# Patient Record
Sex: FEMALE
[De-identification: names, ages, dates, MRNs, and addresses within clinical notes are randomized per-mention and may not be internally consistent; named-entity substitution may affect disease eponyms.]

---

## 2018-08-08 ENCOUNTER — HOSPITAL ENCOUNTER (EMERGENCY)
Dept: HOSPITAL 14 - H.ER | Age: 68
Discharge: HOME | End: 2018-08-08
Payer: MEDICARE

## 2018-08-08 VITALS
SYSTOLIC BLOOD PRESSURE: 137 MMHG | RESPIRATION RATE: 18 BRPM | DIASTOLIC BLOOD PRESSURE: 79 MMHG | HEART RATE: 93 BPM | OXYGEN SATURATION: 98 %

## 2018-08-08 DIAGNOSIS — F41.9: Primary | ICD-10-CM

## 2018-08-08 DIAGNOSIS — G20: ICD-10-CM

## 2018-08-08 NOTE — ED PDOC
HPI: Psych/Substance Abuse


Time Seen by Provider: 08/08/18 20:53


Chief Complaint (Nursing): Anxiety


Chief Complaint (Provider): Anxiety


History Per: Patient


History/Exam Limitations: no limitations


Onset/Duration Of Symptoms: Days (x60)


Current Symptoms Are (Timing): Still Present


Additional Complaint(s): 


66 y/o female with a PMHx of Parkinson's disease, anxiety and breast Cancer 

presents to the ED with sister who states she has been anxious. Sister reports 

anxiousness has been worsening for the past two months. Patient was prescribed 

Xanex for Anxiety. 











PMD: Yasmin Frank





Past Medical History


Reviewed: Historical Data, Nursing Documentation, Vital Signs





- Medical History


PMH: Anxiety, Parkinson's Disease


Other PMH: Breast Cancer





- Surgical History


Surgical History: No Surg Hx





- Family History


Family History: States: Unknown Family Hx





- Home Medications


Home Medications: 


 Ambulatory Orders











 Medication  Instructions  Recorded


 


busPIRone [Buspar] 5 mg PO BID #13 tab 08/08/18














- Allergies


Allergies/Adverse Reactions: 


 Allergies











Allergy/AdvReac Type Severity Reaction Status Date / Time


 


No Known Allergies Allergy   Verified 04/16/16 13:12














Review of Systems


ROS Statement: Except As Marked, All Systems Reviewed And Found Negative


Psych: Positive for: Anxiety





Physical Exam





- Reviewed


Nursing Documentation Reviewed: Yes


Vital Signs Reviewed: Yes





- Physical Exam


Appears: Positive for: Non-toxic, No Acute Distress (Continous movements noted)


Head Exam: Positive for: ATRAUMATIC, NORMOCEPHALIC


Skin: Positive for: Normal Color, Warm, Dry


Eye Exam: Positive for: Normal appearance, EOMI, PERRL


Neck: Positive for: Normal, Painless ROM


Cardiovascular/Chest: Positive for: Regular Rate, Rhythm.  Negative for: Murmur


Respiratory: Positive for: Normal Breath Sounds.  Negative for: Respiratory 

Distress


Gastrointestinal/Abdominal: Positive for: Normal Exam, Soft.  Negative for: 

Tenderness


Back: Positive for: Normal Inspection.  Negative for: L CVA Tenderness, R CVA 

Tenderness, Vertebral Tenderness


Extremity: Positive for: Normal ROM.  Negative for: Deformity


Neurologic/Psych: Positive for: Alert (Awake)





Medical Decision Making


Medical Decision Making: 


Time: 2110


Plan:


-- Crisis Evaluation as ordered











________________________________________________________________________________

___________


Scribe Attestation:


Documented by Dee Capps acting as a scribe for Dr. Jeni Dickey MD.





Provider Scribe Attestation:


All medical record entries made by the Scribe were at my direction and 

personally dictated by me. I have reviewed the chart and agree that the record 

accurately reflects my personal performance of the history, physical exam, 

medical decision making, and the department course for this patient. I have 

also personally directed, reviewed, and agree with the discharge instructions 

and disposition.





Disposition





- Clinical Impression


Clinical Impression: 


 Anxiety








- Disposition


Disposition: Routine/Home


Disposition Time: 21:52


Condition: STABLE


Additional Instructions: 


FOLLOW-UP WITH OUTPATIENT PSYCH AS ADVISED. 


Prescriptions: 


busPIRone [Buspar] 5 mg PO BID #13 tab


Instructions:  Anxiety, Adult (DC)


Forms:  [employer] (Lao)


Print Language: Turkish

## 2018-08-22 ENCOUNTER — HOSPITAL ENCOUNTER (EMERGENCY)
Dept: HOSPITAL 14 - H.ER | Age: 68
Discharge: HOME | End: 2018-08-22
Payer: MEDICARE

## 2018-08-22 VITALS
RESPIRATION RATE: 18 BRPM | SYSTOLIC BLOOD PRESSURE: 116 MMHG | DIASTOLIC BLOOD PRESSURE: 70 MMHG | TEMPERATURE: 98.4 F | HEART RATE: 88 BPM

## 2018-08-22 VITALS — OXYGEN SATURATION: 99 %

## 2018-08-22 DIAGNOSIS — F41.9: ICD-10-CM

## 2018-08-22 DIAGNOSIS — G20: ICD-10-CM

## 2018-08-22 DIAGNOSIS — F41.0: Primary | ICD-10-CM

## 2018-08-22 LAB
ANISOCYTOSIS BLD QL SMEAR: (no result)
BASOPHILS # BLD AUTO: 0 K/UL (ref 0–0.2)
BASOPHILS NFR BLD: 1 % (ref 0–2)
BASOPHILS NFR BLD: 1 % (ref 0–2)
BILIRUB UR-MCNC: NEGATIVE MG/DL
BUN SERPL-MCNC: 33 MG/DL (ref 7–17)
CALCIUM SERPL-MCNC: 9 MG/DL (ref 8.4–10.2)
COLOR UR: YELLOW
EOSINOPHIL # BLD AUTO: 0.2 K/UL (ref 0–0.7)
EOSINOPHIL NFR BLD: 4 % (ref 0–7)
EOSINOPHIL NFR BLD: 4.4 % (ref 0–4)
ERYTHROCYTE [DISTWIDTH] IN BLOOD BY AUTOMATED COUNT: 15.7 % (ref 11.5–14.5)
GFR NON-AFRICAN AMERICAN: > 60
GLUCOSE UR STRIP-MCNC: (no result) MG/DL
HGB BLD-MCNC: 8.7 G/DL (ref 12–16)
LEUKOCYTE ESTERASE UR-ACNC: (no result) LEU/UL
LYMPHOCYTE: 11 % (ref 20–50)
LYMPHOCYTES # BLD AUTO: 0.4 K/UL (ref 1–4.3)
LYMPHOCYTES NFR BLD AUTO: 9.6 % (ref 20–40)
MCH RBC QN AUTO: 29.1 PG (ref 27–31)
MCHC RBC AUTO-ENTMCNC: 32.7 G/DL (ref 33–37)
MCV RBC AUTO: 89 FL (ref 81–99)
MONOCYTE: 8 % (ref 0–10)
MONOCYTES # BLD: 0.3 K/UL (ref 0–0.8)
MONOCYTES NFR BLD: 6.5 % (ref 0–10)
NEUTROPHILS # BLD: 3.2 K/UL (ref 1.8–7)
NEUTROPHILS NFR BLD AUTO: 76 % (ref 42–75)
NEUTROPHILS NFR BLD AUTO: 78.5 % (ref 50–75)
NRBC BLD AUTO-RTO: 0 % (ref 0–0)
PH UR STRIP: 6 [PH] (ref 5–8)
PLATELET # BLD EST: NORMAL 10*3/UL
PLATELET # BLD: 305 K/UL (ref 130–400)
PMV BLD AUTO: 7.3 FL (ref 7.2–11.7)
POIKILOCYTOSIS BLD QL SMEAR: SLIGHT
PROT UR STRIP-MCNC: NEGATIVE MG/DL
RBC # BLD AUTO: 3 MIL/UL (ref 3.8–5.2)
RBC # UR STRIP: NEGATIVE /UL
SP GR UR STRIP: 1.02 (ref 1–1.03)
SQUAMOUS EPITHIAL: 2 /HPF (ref 0–5)
TOTAL CELLS COUNTED BLD: 100
TOXIC GRANULES BLD QL SMEAR: PRESENT
URINE CLARITY: (no result)
UROBILINOGEN UR-MCNC: (no result) MG/DL (ref 0.2–1)
WBC # BLD AUTO: 4.1 K/UL (ref 4.8–10.8)

## 2018-08-22 PROCEDURE — 81003 URINALYSIS AUTO W/O SCOPE: CPT

## 2018-08-22 PROCEDURE — 85025 COMPLETE CBC W/AUTO DIFF WBC: CPT

## 2018-08-22 PROCEDURE — 82948 REAGENT STRIP/BLOOD GLUCOSE: CPT

## 2018-08-22 PROCEDURE — 80048 BASIC METABOLIC PNL TOTAL CA: CPT

## 2018-08-22 PROCEDURE — 99285 EMERGENCY DEPT VISIT HI MDM: CPT

## 2018-08-22 NOTE — ED PDOC
HPI: Psych/Substance Abuse


Time Seen by Provider: 08/22/18 17:54


Chief Complaint (Nursing): Psychiatric Evaluation


Chief Complaint (Provider): Psychiatric Evaluation


History Per: Patient


History/Exam Limitations: no limitations


Onset/Duration Of Symptoms: Days


Current Symptoms Are (Timing): Still Present


Associated Symptoms: Anxiety.  denies: Depression, Suicidal Thoughts, Suicidal 

Plan


Additional History Per: Family (sister)


Additional Complaint(s): 


67 year old female presents to the ED for a psychiatric evaluation. Patient 

presents with her sister stating she has anxiety attacks for several months.  

She visited the psychiatrist and had a panic attack so she was referred to the 

ED. Patient feels anxious now and she does not take any medication for her 

symptoms. Patient denies headache, chest pain, homicidal or suicidal ideation. 


PMD: Dr. Thomason








Past Medical History


Reviewed: Historical Data, Nursing Documentation, Vital Signs


Vital Signs: 





 Last Vital Signs











Temp  98.0 F   08/22/18 17:32


 


Pulse  102 H  08/22/18 17:32


 


Resp  17   08/22/18 17:32


 


BP  107/62   08/22/18 17:32


 


Pulse Ox  99   08/22/18 17:32














- Medical History


PMH: Anemia, Anxiety, Parkinson's Disease


   Denies: Diabetes, Hepatitis, HIV, HTN, Seizures, Sexually Transmitted Disease





- Family History


Family History: States: Unknown Family Hx





- Social History


Current smoker - smoking cessation education provided: No


Alcohol: None


Drugs: Denies





- Home Medications


Home Medications: 


 Ambulatory Orders











 Medication  Instructions  Recorded


 


busPIRone [Buspar] 5 mg PO BID #13 tab 08/08/18














- Allergies


Allergies/Adverse Reactions: 


 Allergies











Allergy/AdvReac Type Severity Reaction Status Date / Time


 


No Known Allergies Allergy   Verified 04/16/16 13:12














Review of Systems


ROS Statement: Except As Marked, All Systems Reviewed And Found Negative


Cardiovascular: Negative for: Chest Pain


Neurological: Negative for: Headache


Psych: Positive for: Anxiety.  Negative for: Suicidal ideation (homicidal 

ideation)





Physical Exam





- Reviewed


Nursing Documentation Reviewed: Yes


Vital Signs Reviewed: Yes





- Physical Exam


Appears: Positive for: Non-toxic, No Acute Distress


Head Exam: Positive for: ATRAUMATIC, NORMAL INSPECTION, NORMOCEPHALIC


Skin: Positive for: Normal Color, Warm, Dry


Eye Exam: Positive for: Normal appearance, EOMI, PERRL


ENT: Positive for: Normal ENT Inspection


Neck: Positive for: Normal, Painless ROM, Supple


Cardiovascular/Chest: Positive for: Regular Rate, Rhythm.  Negative for: Murmur


Respiratory: Positive for: Normal Breath Sounds.  Negative for: Decreased 

Breath Sounds, Wheezing, Respiratory Distress


Gastrointestinal/Abdominal: Positive for: Normal Exam, Bowel Sounds, Soft.  

Negative for: Tenderness, Guarding, Rebound


Back: Positive for: Normal Inspection.  Negative for: L CVA Tenderness, R CVA 

Tenderness


Extremity: Positive for: Normal ROM.  Negative for: Tenderness, Pedal Edema, 

Deformity


Neurologic/Psych: Positive for: Alert, Oriented (x3), Mood/Affect (anxious)





- Laboratory Results


Result Diagrams: 


 08/22/18 18:20





 08/22/18 18:20





- ECG


O2 Sat by Pulse Oximetry: 99 (RA)


Pulse Ox Interpretation: Normal





Medical Decision Making


Medical Decision Making: 


Time: 1804


Initial Impression: anxiety and panic attacks


Differential Diagnosis includes but is not limited to: depression  


Initial Plan:


--Alcohol Serum 


--BMP


--Drug Screen


--Crisis Evaluation as Ordered 


--CBC w/ Differential 


--Urinalysis 


--Reevaluation 








Time: 1900


Patient signed out to Dr. Kimbrough pending reevaluation and disposition


--------------------------------------------------------------------------------

-----------------


Scribe Attestation:   


Documented by Yue Chan, acting as a scribe for Yefri Sosa MD





Provider Scribe Attestation:


All medical record entries made by the Scribe were at my direction and 

personally dictated by me. I have reviewed the chart and agree that the record 

accurately reflects my personal performance of the history, physical exam, 

medical decision making, and the department course for this patient. I have 

also personally directed, reviewed, and agree with the discharge instructions 

and disposition.











Disposition





- Clinical Impression


Clinical Impression: 


 Parkinson disease, Anxiety








- Patient ED Disposition


Is Patient to be Admitted: No


Counseled Patient/Family Regarding: Studies Performed, Diagnosis





- Disposition


Disposition: Transfer of Care


Disposition Time: 19:00


Condition: STABLE


Instructions:  Parkinson Disease, Anxiety, Adult (DC)


Patient Signed Over To: Obed Kimbrough (pending reevaluation and 

disposition)

## 2018-09-04 ENCOUNTER — HOSPITAL ENCOUNTER (EMERGENCY)
Dept: HOSPITAL 14 - H.ER | Age: 68
Discharge: HOME | End: 2018-09-04
Payer: MEDICARE

## 2018-09-04 VITALS — DIASTOLIC BLOOD PRESSURE: 63 MMHG | RESPIRATION RATE: 14 BRPM | HEART RATE: 72 BPM | SYSTOLIC BLOOD PRESSURE: 119 MMHG

## 2018-09-04 VITALS — TEMPERATURE: 98.2 F

## 2018-09-04 DIAGNOSIS — Z85.3: ICD-10-CM

## 2018-09-04 DIAGNOSIS — F41.9: Primary | ICD-10-CM

## 2018-09-04 DIAGNOSIS — G20: ICD-10-CM

## 2018-09-04 PROCEDURE — 96372 THER/PROPH/DIAG INJ SC/IM: CPT

## 2018-09-04 PROCEDURE — 99284 EMERGENCY DEPT VISIT MOD MDM: CPT

## 2018-09-04 PROCEDURE — 81025 URINE PREGNANCY TEST: CPT

## 2018-09-04 NOTE — ED PDOC
HPI: Psych/Substance Abuse


Time Seen by Provider: 09/04/18 16:35


Chief Complaint (Nursing): Anxiety


Chief Complaint (Provider): Anxiety


History Per: Patient, Family


History/Exam Limitations: no limitations


Onset/Duration Of Symptoms: Other (x6 months)


Current Symptoms Are (Timing): Still Present


Associated Symptoms: Anxiety


Additional Complaint(s): 





67 year old female presents to the ED complaining of anxiety and nervousness 

for about 6 months.  Patient's family member reports that for the past month, 

symptoms have been worsening, have been occurring frequently, and is unable to 

sleep at night.  Patient has a history of Parkinson's disease and has been 

compliant with medications.  She has not taken any medications for anxiety and 

nervousness and family member does not think these complaints have to do with 

Parkinson's disease.  Today's visit is nothing special as it happens every day.

  She hyperventilates and cries when she is nervous and sweats with anxiety.  

Denies CP, SOB, and fever.  Patient's family member is requesting to be seen by 

psychotherapist and wants an appointment.  





PMD: Yasmin Mckeon





Past Medical History


Reviewed: Historical Data, Nursing Documentation, Vital Signs





- Medical History


PMH: Anemia, Anxiety, Parkinson's Disease


   Denies: Diabetes, Hepatitis, HIV, HTN, Seizures, Sexually Transmitted Disease


Other PMH: Breast cancer (many years ago)





- Surgical History


Surgical History: No Surg Hx


Other surgeries: mastectomy





- Family History


Family History: States: Unknown Family Hx





- Home Medications


Home Medications: 


 Ambulatory Orders











 Medication  Instructions  Recorded


 


busPIRone [Buspar] 5 mg PO BID #13 tab 08/08/18














- Allergies


Allergies/Adverse Reactions: 


 Allergies











Allergy/AdvReac Type Severity Reaction Status Date / Time


 


No Known Allergies Allergy   Verified 09/04/18 15:48














Review of Systems


ROS Statement: Except As Marked, All Systems Reviewed And Found Negative


Constitutional: Negative for: Fever


Cardiovascular: Negative for: Chest Pain


Respiratory: Negative for: Shortness of Breath


Psych: Positive for: Anxiety, Other (Nervousness)





Physical Exam





- Reviewed


Nursing Documentation Reviewed: Yes


Vital Signs Reviewed: Yes





- Physical Exam


Appears: Positive for: Non-toxic, No Acute Distress (anxious and crying; has 

dyskinesia)


Head Exam: Positive for: ATRAUMATIC, NORMOCEPHALIC


Skin: Positive for: Normal Color, Warm, Dry


Eye Exam: Positive for: Normal appearance


Neck: Positive for: Normal, Painless ROM


Cardiovascular/Chest: Positive for: Regular Rate, Rhythm.  Negative for: Murmur


Respiratory: Positive for: Normal Breath Sounds.  Negative for: Wheezing, 

Respiratory Distress


Extremity: Positive for: Normal ROM


Neurologic/Psych: Positive for: Alert, Oriented.  Negative for: Motor/Sensory 

Deficits





- ECG


O2 Sat by Pulse Oximetry: 98 (RA)


Pulse Ox Interpretation: Normal





Medical Decision Making


Medical Decision Making: 





Initial Impression: Anxiety





Initial Plan: 


Crisis evaluation





Patient is requesting to see a psychiatrist and is requesting treatment for 

anxiety.





17:00


Patient endorsed to Dr. Kendall.  Pending crisis evaluation.


--------------------------------------------------------------------------------

-----------------


Scribe Attestation:


Documented by Ventura Champagne acting as a scribe for Yefri Sosa MD.





Provider Scribe Attestation:


All medical record entries made by the Scribe were at my direction and 

personally dictated by me. I have reviewed the chart and agree that the record 

accurately reflects my personal performance of the history, physical exam, 

medical decision making, and the department course for this patient. I have 

also personally directed, reviewed, and agree with the discharge instructions 

and disposition.





Disposition





- Clinical Impression


Clinical Impression: 


 Anxiety








- Patient ED Disposition


Is Patient to be Admitted: Transfer of Care


Counseled Patient/Family Regarding: Studies Performed, Diagnosis





- Disposition


Disposition: Transfer of Care


Disposition Time: 17:00


Condition: IMPROVED


Additional Instructions: 


follow up with outpatient psychiatric services as instructed


return to the ED with any worsening or concerning symptoms


Instructions:  Anxiety, Adult (DC)


Patient Signed Over To: Adan Kendall

## 2018-09-04 NOTE — ED PDOC
- ECG


O2 Sat by Pulse Oximetry: 98 (RA)





Medical Decision Making


Medical Decision Makin:00


Patient endorsed to me from Dr. Sosa.  Pending crisis evaluation.  





18:55


Patient seen by crisis and is cleared for discharge by Dr. Milligan. Patient will 

be discharged with her sister.





--------------------------------------------------------------------------------

-----------------


Scribe Attestation:


Documented by Ventura Champagne acting as a scribe for Adan Kendall MD.





Provider Scribe Attestation:


All medical record entries made by the Scribe were at my direction and 

personally dictated by me. I have reviewed the chart and agree that the record 

accurately reflects my personal performance of the history, physical exam, 

medical decision making, and the department course for this patient. I have 

also personally directed, reviewed, and agree with the discharge instructions 

and disposition.





Disposition





- Clinical Impression


Clinical Impression: 


 Anxiety








- POA


Present On Arrival: None





- Disposition


Disposition: Routine/Home


Disposition Time: 18:45


Condition: IMPROVED


Additional Instructions: 


follow up with outpatient psychiatric services as instructed


return to the ED with any worsening or concerning symptoms


Instructions:  Anxiety, Adult (DC)


Forms:  [facility] (English)

## 2018-09-05 VITALS — OXYGEN SATURATION: 98 %
